# Patient Record
Sex: MALE | Race: WHITE | NOT HISPANIC OR LATINO | URBAN - METROPOLITAN AREA
[De-identification: names, ages, dates, MRNs, and addresses within clinical notes are randomized per-mention and may not be internally consistent; named-entity substitution may affect disease eponyms.]

---

## 2017-08-23 ENCOUNTER — IMPORTED ENCOUNTER (OUTPATIENT)
Dept: URBAN - METROPOLITAN AREA CLINIC 31 | Facility: CLINIC | Age: 47
End: 2017-08-23

## 2017-08-23 PROBLEM — H10.403: Noted: 2017-08-23

## 2017-08-23 PROCEDURE — 92015 DETERMINE REFRACTIVE STATE: CPT

## 2017-08-23 PROCEDURE — V2520 CONTACT LENS HYDROPHILIC: HCPCS

## 2017-08-23 PROCEDURE — 92310 CONTACT LENS FITTING OU: CPT

## 2017-08-23 PROCEDURE — 92014 COMPRE OPH EXAM EST PT 1/>: CPT

## 2017-08-24 ENCOUNTER — OFFICE VISIT (OUTPATIENT)
Dept: PODIATRY | Facility: CLINIC | Age: 47
End: 2017-08-24

## 2017-08-24 VITALS — BODY MASS INDEX: 41.75 KG/M2 | HEIGHT: 73 IN | WEIGHT: 315 LBS

## 2017-08-24 DIAGNOSIS — M72.2 PLANTAR FASCIITIS: Primary | ICD-10-CM

## 2017-08-24 DIAGNOSIS — M79.672 FOOT PAIN, LEFT: ICD-10-CM

## 2017-08-24 DIAGNOSIS — M21.42 PES PLANUS OF BOTH FEET: ICD-10-CM

## 2017-08-24 DIAGNOSIS — M24.573 EQUINUS CONTRACTURE OF ANKLE: ICD-10-CM

## 2017-08-24 DIAGNOSIS — M21.41 PES PLANUS OF BOTH FEET: ICD-10-CM

## 2017-08-24 PROCEDURE — 99203 OFFICE O/P NEW LOW 30 MIN: CPT | Performed by: PODIATRIST

## 2017-08-24 RX ORDER — MELOXICAM 15 MG/1
TABLET ORAL
COMMUNITY
Start: 2017-08-11

## 2017-08-24 RX ORDER — METHYLPREDNISOLONE 4 MG/1
TABLET ORAL
Qty: 21 TABLET | Refills: 0 | Status: SHIPPED | OUTPATIENT
Start: 2017-08-24

## 2017-08-24 NOTE — PROGRESS NOTES
"Lazaro Altman  1970  46 y.o. male    08/24/2017  Chief Complaint   Patient presents with   • Left Foot - Pain           History of Present Illness    Lazaro Altman is a 46 y.o. male who presents for evaluation of left foot pain. Pain is primarily in heel and arch. This has been ongoing for a few months. Describes the pain as sharp and worse with weightbearing. Denies trauma or injuries. Denies prior treatments for this issue.         No past medical history on file.      No past surgical history on file.      No family history on file.      Social History     Social History   • Marital status:      Spouse name: N/A   • Number of children: N/A   • Years of education: N/A     Occupational History   • Not on file.     Social History Main Topics   • Smoking status: Former Smoker   • Smokeless tobacco: Not on file   • Alcohol use No   • Drug use: Not on file   • Sexual activity: Defer     Other Topics Concern   • Not on file     Social History Narrative   • No narrative on file         Current Outpatient Prescriptions   Medication Sig Dispense Refill   • meloxicam (MOBIC) 15 MG tablet        No current facility-administered medications for this visit.          OBJECTIVE    Ht 73\" (185.4 cm)  Wt (!) 350 lb (159 kg)  BMI 46.18 kg/m2      Review of Systems   Constitutional:  Denies recent weight loss, weight gain, fever or chills, no change in exercise tolerance  Musculoskeletal: Heel/foot pain.   Skin: No wounds or lesions  Neurological: Denies paresthesias.  Psychiatric/Behavioral: Denies depression        Physical Exam   Constitutional: he appears well-developed and well-nourished.   HEENT: Normocephalic. Atraumatic.  CV: No CP. RRR  Resp: Non-labored respirations.  Psychiatric: he has a normal mood and affect. his behavior is normal.         Lower Extremity Exam:  Vascular: DP/PT pulses palpable 2+.   Minimal plantar heel edema, Left  Foot warm  Neuro: Protective sensation intact, b/l.  DTRs " intact  Negative Tinel over tarsal tunnel  Integument: No open wounds or lesions.  No erythema, scaling  No masses  Musculoskeletal: LE muscle strength 5/5.   Gait normal  Ankle ROM decreased without pain or crepitus  STJ ROM full without pain or crepitus  Pain on palpation of plantar calcaneal tubercles medial plantar fascial band, Left  Minimal tenderness to lateral compression of calcaneus, Left  Pes planus      Foot foot radiographs- Normal osseous density. No acute fractures, subluxations or erosions. Pes planus. Calcaneal spurring.           ASSESSMENT AND PLAN    Lazaro was seen today for pain.    Diagnoses and all orders for this visit:    Foot pain, left  -     XR Foot 3+ View Left    -Comprehensive foot and ankle exam performed  -Radiographs ordered and reviewed  -Educated pt on diagnosis, etiology and treatment of plantar fasciitis.  -Continue motion control shoe  -Recommend power step over-the-counter inserts  -Aggressive stretching regimen, patient education materials dispensed  -Rx Medrol dosepak x 1 week  -Corticosteroid injection in the future if necessary  -Recheck 4 weeks            This document has been electronically signed by Sarina Porras MA on August 24, 2017 3:52 PM     EMR Dragon/Transcription disclaimer:   Much of this encounter note is an electronic transcription/translation of spoken language to printed text. The electronic translation of spoken language may permit erroneous, or at times, nonsensical words or phrases to be inadvertently transcribed; Although I have reviewed the note for such errors, some may still exist.    Sarina Porras MA  8/24/2017  3:52 PM

## 2019-11-22 ENCOUNTER — IMPORTED ENCOUNTER (OUTPATIENT)
Dept: URBAN - METROPOLITAN AREA CLINIC 31 | Facility: CLINIC | Age: 49
End: 2019-11-22

## 2019-11-22 PROBLEM — H43.812: Noted: 2019-11-22

## 2019-11-22 PROCEDURE — 92014 COMPRE OPH EXAM EST PT 1/>: CPT

## 2019-11-22 PROCEDURE — 92310 CONTACT LENS FITTING OU: CPT

## 2019-11-22 PROCEDURE — 92015 DETERMINE REFRACTIVE STATE: CPT

## 2019-11-22 PROCEDURE — 92250 FUNDUS PHOTOGRAPHY W/I&R: CPT

## 2021-05-08 ENCOUNTER — APPOINTMENT (OUTPATIENT)
Dept: CT IMAGING | Facility: HOSPITAL | Age: 51
End: 2021-05-08

## 2021-05-08 ENCOUNTER — HOSPITAL ENCOUNTER (EMERGENCY)
Facility: HOSPITAL | Age: 51
Discharge: LEFT BEFORE DISCHARGE | End: 2021-05-08
Attending: FAMILY MEDICINE | Admitting: FAMILY MEDICINE

## 2021-05-08 ENCOUNTER — APPOINTMENT (OUTPATIENT)
Dept: GENERAL RADIOLOGY | Facility: HOSPITAL | Age: 51
End: 2021-05-08

## 2021-05-08 VITALS
DIASTOLIC BLOOD PRESSURE: 86 MMHG | SYSTOLIC BLOOD PRESSURE: 182 MMHG | RESPIRATION RATE: 18 BRPM | OXYGEN SATURATION: 99 % | BODY MASS INDEX: 42.66 KG/M2 | HEART RATE: 62 BPM | HEIGHT: 72 IN | WEIGHT: 315 LBS | TEMPERATURE: 98.6 F

## 2021-05-08 DIAGNOSIS — I10 HYPERTENSION, UNSPECIFIED TYPE: ICD-10-CM

## 2021-05-08 DIAGNOSIS — M54.9 UPPER BACK PAIN: ICD-10-CM

## 2021-05-08 DIAGNOSIS — R20.0 RIGHT FACIAL NUMBNESS: Primary | ICD-10-CM

## 2021-05-08 LAB
ABO GROUP BLD: NORMAL
ALBUMIN SERPL-MCNC: 4.3 G/DL (ref 3.5–5.2)
ALBUMIN/GLOB SERPL: 1.5 G/DL
ALP SERPL-CCNC: 104 U/L (ref 39–117)
ALT SERPL W P-5'-P-CCNC: 27 U/L (ref 1–41)
ANION GAP SERPL CALCULATED.3IONS-SCNC: 8 MMOL/L (ref 5–15)
ANISOCYTOSIS BLD QL: ABNORMAL
AST SERPL-CCNC: 40 U/L (ref 1–40)
BILIRUB SERPL-MCNC: 0.5 MG/DL (ref 0–1.2)
BLD GP AB SCN SERPL QL: NEGATIVE
BUN SERPL-MCNC: 19 MG/DL (ref 6–20)
BUN/CREAT SERPL: 16.5 (ref 7–25)
CALCIUM SPEC-SCNC: 10.5 MG/DL (ref 8.6–10.5)
CHLORIDE SERPL-SCNC: 98 MMOL/L (ref 98–107)
CO2 SERPL-SCNC: 32 MMOL/L (ref 22–29)
CREAT SERPL-MCNC: 1.15 MG/DL (ref 0.76–1.27)
DEPRECATED RDW RBC AUTO: 38.9 FL (ref 37–54)
EOSINOPHIL # BLD MANUAL: 0.58 10*3/MM3 (ref 0–0.4)
EOSINOPHIL NFR BLD MANUAL: 4 % (ref 0.3–6.2)
ERYTHROCYTE [DISTWIDTH] IN BLOOD BY AUTOMATED COUNT: 14.3 % (ref 12.3–15.4)
FLUAV RNA RESP QL NAA+PROBE: NOT DETECTED
FLUBV RNA RESP QL NAA+PROBE: NOT DETECTED
GFR SERPL CREATININE-BSD FRML MDRD: 67 ML/MIN/1.73
GLOBULIN UR ELPH-MCNC: 2.9 GM/DL
GLUCOSE SERPL-MCNC: 129 MG/DL (ref 65–99)
HCT VFR BLD AUTO: 43.2 % (ref 37.5–51)
HGB BLD-MCNC: 14.4 G/DL (ref 13–17.7)
HOLD SPECIMEN: NORMAL
HOLD SPECIMEN: NORMAL
INR PPP: 0.99 (ref 0.8–1.2)
LYMPHOCYTES # BLD MANUAL: 4.18 10*3/MM3 (ref 0.7–3.1)
LYMPHOCYTES NFR BLD MANUAL: 28 % (ref 19.6–45.3)
LYMPHOCYTES NFR BLD MANUAL: 4 % (ref 5–12)
Lab: NORMAL
MCH RBC QN AUTO: 26 PG (ref 26.6–33)
MCHC RBC AUTO-ENTMCNC: 33.3 G/DL (ref 31.5–35.7)
MCV RBC AUTO: 78.1 FL (ref 79–97)
METAMYELOCYTES NFR BLD MANUAL: 5 % (ref 0–0)
MICROCYTES BLD QL: ABNORMAL
MONOCYTES # BLD AUTO: 0.58 10*3/MM3 (ref 0.1–0.9)
NEUTROPHILS # BLD AUTO: 8.36 10*3/MM3 (ref 1.7–7)
NEUTROPHILS NFR BLD MANUAL: 49 % (ref 42.7–76)
NEUTS BAND NFR BLD MANUAL: 9 % (ref 0–5)
NRBC SPEC MANUAL: 1 /100 WBC (ref 0–0.2)
PLATELET # BLD AUTO: 115 10*3/MM3 (ref 140–450)
PMV BLD AUTO: 10 FL (ref 6–12)
POTASSIUM SERPL-SCNC: 4 MMOL/L (ref 3.5–5.2)
PROT SERPL-MCNC: 7.2 G/DL (ref 6–8.5)
PROTHROMBIN TIME: 13.5 SECONDS (ref 11.1–15.3)
QT INTERVAL: 402 MS
QTC INTERVAL: 414 MS
RBC # BLD AUTO: 5.53 10*6/MM3 (ref 4.14–5.8)
RH BLD: POSITIVE
SARS-COV-2 RNA RESP QL NAA+PROBE: NOT DETECTED
SMALL PLATELETS BLD QL SMEAR: ABNORMAL
SODIUM SERPL-SCNC: 138 MMOL/L (ref 136–145)
T&S EXPIRATION DATE: NORMAL
TOXIC GRANULATION: ABNORMAL
VARIANT LYMPHS NFR BLD MANUAL: 1 % (ref 0–5)
WBC # BLD AUTO: 14.41 10*3/MM3 (ref 3.4–10.8)
WHOLE BLOOD HOLD SPECIMEN: NORMAL

## 2021-05-08 PROCEDURE — 70450 CT HEAD/BRAIN W/O DYE: CPT

## 2021-05-08 PROCEDURE — 99204 OFFICE O/P NEW MOD 45 MIN: CPT | Performed by: PSYCHIATRY & NEUROLOGY

## 2021-05-08 PROCEDURE — 0 IOPAMIDOL PER 1 ML: Performed by: FAMILY MEDICINE

## 2021-05-08 PROCEDURE — 93010 ELECTROCARDIOGRAM REPORT: CPT | Performed by: INTERNAL MEDICINE

## 2021-05-08 PROCEDURE — 70498 CT ANGIOGRAPHY NECK: CPT

## 2021-05-08 PROCEDURE — 71045 X-RAY EXAM CHEST 1 VIEW: CPT

## 2021-05-08 PROCEDURE — 25010000002 HYDRALAZINE PER 20 MG: Performed by: PHYSICIAN ASSISTANT

## 2021-05-08 PROCEDURE — 80053 COMPREHEN METABOLIC PANEL: CPT | Performed by: FAMILY MEDICINE

## 2021-05-08 PROCEDURE — 86901 BLOOD TYPING SEROLOGIC RH(D): CPT | Performed by: PHYSICIAN ASSISTANT

## 2021-05-08 PROCEDURE — 99284 EMERGENCY DEPT VISIT MOD MDM: CPT

## 2021-05-08 PROCEDURE — 93005 ELECTROCARDIOGRAM TRACING: CPT

## 2021-05-08 PROCEDURE — 86900 BLOOD TYPING SEROLOGIC ABO: CPT | Performed by: PHYSICIAN ASSISTANT

## 2021-05-08 PROCEDURE — 85007 BL SMEAR W/DIFF WBC COUNT: CPT | Performed by: FAMILY MEDICINE

## 2021-05-08 PROCEDURE — 86850 RBC ANTIBODY SCREEN: CPT | Performed by: PHYSICIAN ASSISTANT

## 2021-05-08 PROCEDURE — 70496 CT ANGIOGRAPHY HEAD: CPT

## 2021-05-08 PROCEDURE — 96374 THER/PROPH/DIAG INJ IV PUSH: CPT

## 2021-05-08 PROCEDURE — 93005 ELECTROCARDIOGRAM TRACING: CPT | Performed by: FAMILY MEDICINE

## 2021-05-08 PROCEDURE — 87636 SARSCOV2 & INF A&B AMP PRB: CPT | Performed by: PHYSICIAN ASSISTANT

## 2021-05-08 PROCEDURE — 85025 COMPLETE CBC W/AUTO DIFF WBC: CPT

## 2021-05-08 PROCEDURE — 85610 PROTHROMBIN TIME: CPT | Performed by: PHYSICIAN ASSISTANT

## 2021-05-08 RX ORDER — SODIUM CHLORIDE 0.9 % (FLUSH) 0.9 %
10 SYRINGE (ML) INJECTION AS NEEDED
Status: DISCONTINUED | OUTPATIENT
Start: 2021-05-08 | End: 2021-05-08 | Stop reason: HOSPADM

## 2021-05-08 RX ORDER — HYDRALAZINE HYDROCHLORIDE 20 MG/ML
10 INJECTION INTRAMUSCULAR; INTRAVENOUS ONCE
Status: COMPLETED | OUTPATIENT
Start: 2021-05-08 | End: 2021-05-08

## 2021-05-08 RX ADMIN — IOPAMIDOL 90 ML: 755 INJECTION, SOLUTION INTRAVENOUS at 12:30

## 2021-05-08 RX ADMIN — HYDRALAZINE HYDROCHLORIDE 10 MG: 20 INJECTION INTRAMUSCULAR; INTRAVENOUS at 11:39

## 2021-05-08 RX ADMIN — Medication 10 ML: at 11:43

## 2021-05-08 NOTE — CONSULTS
Stroke Consult Note    Patient Name: Lazaro Altman   MRN: [id number]  Age: 50 y.o.  Sex: male  : 1970    Primary Care Physician: Mario Patel MD  Referring Physician:  Ajit Wakefield MD      Chief Complaint/Reason for Consultation: Blurry vision    Subjective:    HPI:   History obtained from patient and from medical staff, no family members at bedside.  No previous history of recent stroke or transient ischemic attack, no history of seizures or passing out. No history of focal weakness suggesting relapsing remitting MS.  Patient taken no anticoagulation therapy or antiplatelet therapy.  No history of head trauma or meningitis or strong family history of epilepsy.  No strong family history of aneurysm. No recent change in patient daily routine.  No recent new medication her symptoms started unprovoked.patient complaint nonspecific headaches after he diagnosed of Covid 2020.  Patient complaint last week episode of right facial numbness blurry vision, associate elevated blood pressure.    ROS:  12 points of review of systems negative except as above.    History reviewed. No pertinent past medical history.  History reviewed. No pertinent surgical history.  History reviewed. No pertinent family history.  Social History     Socioeconomic History   • Marital status:      Spouse name: Not on file   • Number of children: Not on file   • Years of education: Not on file   • Highest education level: Not on file   Tobacco Use   • Smoking status: Former Smoker   Substance and Sexual Activity   • Alcohol use: No   • Sexual activity: Defer       Allergies   Allergen Reactions   • Penicillins      Prior to Admission medications    Medication Sig Start Date End Date Taking? Authorizing Provider   meloxicam (MOBIC) 15 MG tablet  17   Provider, MD Erin   MethylPREDNISolone (MEDROL, SUNNI,) 4 MG tablet Take as directed 17   Esau Clark DPM       Objective:    Temp:  [98.6 °F (37  °C)] 98.6 °F (37 °C)  Heart Rate:  [56-68] 56  Resp:  [18] 18  BP: (165-207)/() 165/94        NIH Stroke Scale 1  Time: 11:38 CDT  Person Administering Scale: Bashar A Mohsen, MD    1a  Level of consciousness: 0=alert; keenly responsive   1b. LOC questions:  0=Performs both tasks correctly   1c. LOC commands: 0=Performs both tasks correctly   2.  Best Gaze: 0=normal   3.  Visual: 0=No visual loss   4. Facial Palsy: 0=Normal symmetric movement   5a.  Motor left arm: 0=No drift, limb holds 90 (or 45) degrees for full 10 seconds   5b.  Motor right arm: 0=No drift, limb holds 90 (or 45) degrees for full 10 seconds   6a. motor left le=No drift, limb holds 90 (or 45) degrees for full 10 seconds   6b  Motor right le=No drift, limb holds 90 (or 45) degrees for full 10 seconds   7. Limb Ataxia: 0=Absent   8.  Sensory: 1=Mild to moderate sensory loss; patient feels pinprick is less sharp or is dull on the affected side; there is a loss of superficial pain with pinprick but patient is aware He is being touched   9. Best Language:  0=No aphasia, normal   10. Dysarthria: 0=Normal   11. Extinction and Inattention: 0=No abnormality    Total:   1       This was an audio and video enabled telemedicine encounter.       Physical Exam:   AAOX3 follow multi step command, Track objects all directions. No visual filed cut . Pupils equal and reactive. No clear facial droop. No nystagmus. Normal language fluency, repetition and comprehension. Power: Move all extremities without limitation. No drift, leg lags.  Did good finger nose finger. Normal sensory examination except right V3 facial distribution. Didn't evaluate gait at this time.    Hospital Meds:  Scheduled- hydrALAZINE, 10 mg, Intravenous, Once      Infusions-     PRNs- sodium chloride    Results Reviewed:  I have personally reviewed current lab, radiology, and data and agree with results.    Radiology Results          Lab Results  No results found for: CHOL, HDL,  LDLDIRECT, LDL, TRIG  Results from last 7 days   Lab Units 05/08/21  1100   WBC 10*3/mm3 14.41*   HEMOGLOBIN g/dL 14.4   MCV fL 78.1*   PLATELETS 10*3/mm3 115*     Results from last 7 days   Lab Units 05/08/21  1100   SODIUM mmol/L 138   POTASSIUM mmol/L 4.0   CHLORIDE mmol/L 98   CO2 mmol/L 32.0*   BUN mg/dL 19   CREATININE mg/dL 1.15   GLUCOSE mg/dL 129*   CALCIUM mg/dL 10.5       Assessment:  1.hypertensive emergency.  2. Rule out small since lacuna ischemic stroke.  3. Nontypical trigeminal neurologia.      Plan:  1. MRI of the brain and stat CTA  brain and neck.  2. Echocardiogram.  3. Hemoglobin A-1 C and lipid profile.  4. Physical therapy occupational therapy and speech therapy.  6. Aspirin 325 mg daily and Lipitor 40 mg daily, after initial head CT show no bleed.  7. GI and DVT prophylaxis.  8. Stat head CT for any neurological changes.  9. Frequent Carroll checks every 4 to 6 hours and vital sign check.  10. Stroke Risk  factor modification to the primary care team(HTN,DM).  11. All patient questions and concerns answered in detail.  12. Sed rate.    NIH Stroke Scale1  Time: 11:38 CDT  Person Administering Scale: Bashar A Mohsen, MD        This was an audio and video enabled telemedicine encounter.       Electronically signed by Bashar A Mohsen, MD, 05/08/21, 11:38 AM CDT.

## 2021-05-08 NOTE — ED NOTES
Patient requesting to leave at this time. Pt provided with AMA form, signed and witnessed by FREDDY Little. Pt education provided and instructed to return for new or worsening symptoms.      Sandy Tsang RN  05/08/21 [id number]

## 2021-05-08 NOTE — ED NOTES
Pt stated his pain between his shoulder blades was a 7/10. HERNESTO Beasley notified.      Niru Espinoza RN  05/08/21 [id number]

## 2021-05-08 NOTE — ED NOTES
Pt presented to the ER for c/o of right-sided jaw numbness, headache, and back pain occurring for the past 2 weeks. The pt stated he has had burred vision on and off for the past two weeks.      Niru Espinoza RN  05/08/21 110

## 2021-05-08 NOTE — ED PROVIDER NOTES
"Subjective   Patient presents to emergency department for right sided facial numbness x 2 weeks and intermittent blurry vision.  Also endorses upper back pain and elevated blood pressure.  He was seen at urgent care today and sent here for further evaluation.        History provided by:  Patient   used: No        Review of Systems   Constitutional: Negative for chills and fever.   HENT: Negative for sore throat and trouble swallowing.    Respiratory: Negative for cough, shortness of breath and wheezing.    Cardiovascular: Negative for chest pain.   Gastrointestinal: Negative for abdominal pain, nausea and vomiting.   Genitourinary: Negative for dysuria and flank pain.   Musculoskeletal: Positive for back pain.   Skin: Negative for color change.   Allergic/Immunologic: Negative for immunocompromised state.   Neurological: Positive for numbness. Negative for syncope and weakness.   Hematological: Does not bruise/bleed easily.   Psychiatric/Behavioral: Negative for confusion.       History reviewed. No pertinent past medical history.    Allergies   Allergen Reactions   • Penicillins        History reviewed. No pertinent surgical history.    History reviewed. No pertinent family history.    Social History     Socioeconomic History   • Marital status:      Spouse name: Not on file   • Number of children: Not on file   • Years of education: Not on file   • Highest education level: Not on file   Tobacco Use   • Smoking status: Former Smoker   Substance and Sexual Activity   • Alcohol use: No   • Sexual activity: Defer           Objective      BP (!) 182/86   Pulse 62   Temp 98.6 °F (37 °C) (Infrared)   Resp 18   Ht 182.9 cm (72\")   Wt (!) 167 kg (369 lb)   SpO2 99%   BMI 50.05 kg/m²     Physical Exam  Vitals and nursing note reviewed.   Constitutional:       Appearance: Normal appearance.   HENT:      Head: Normocephalic and atraumatic.      Mouth/Throat:      Mouth: Mucous membranes are " "moist.   Eyes:      Conjunctiva/sclera: Conjunctivae normal.   Cardiovascular:      Rate and Rhythm: Normal rate and regular rhythm.      Pulses: Normal pulses.      Heart sounds: Normal heart sounds.   Pulmonary:      Effort: Pulmonary effort is normal. No respiratory distress.      Breath sounds: Normal breath sounds. No wheezing.   Musculoskeletal:         General: No tenderness or deformity.   Skin:     General: Skin is warm.      Capillary Refill: Capillary refill takes less than 2 seconds.   Neurological:      General: No focal deficit present.      Mental Status: He is alert.      Comments: Sensory deficit right face   Psychiatric:         Mood and Affect: Mood normal.         Behavior: Behavior normal.         Thought Content: Thought content normal.         ECG 12 Lead      Date/Time: 5/8/2021 2:34 PM  Performed by: Ramos Robert PA-C  Authorized by: Ajit Wakefield MD   Interpreted by physician  Comparison: compared with previous ECG   Rhythm: sinus rhythm  Rate: normal  BPM: 64  ST Segments: ST segments normal  Clinical impression: normal ECG                 ED Course  ED Course as of May 08 2058   Sat May 08, 2021   1121 Discussed with Dr Mohsen.      [SHUBHAM]   1419 Paged Hospitalist.    [SHUBHAM]   1414 I discussed results with patient and need for admission to the hospital for further evaluation/treatment.  Patient is concerned over his upper back pain and I ordered pain medications for this and told him we would take a closer look at that as well.  Patient states \"I am just going to go home and be in pain\".  I advised patient strongly against this due to his hypertension, possible stroke-like symptoms, back pain and need for further evaluation/treatment.  Advised possible poor outcome up to and including death.  Patient persists despite education.  I advised him he may return at any time for further evaluation and treatment.    [SHUBHAM]      ED Course User Index  [SHUBHAM] Ramos Robert PA-C    "        Results for orders placed or performed during the hospital encounter of 05/08/21   COVID-19 and FLU A/B PCR - Swab, Nasopharynx    Specimen: Nasopharynx; Swab   Result Value Ref Range    COVID19 Not Detected Not Detected - Ref. Range    Influenza A PCR Not Detected Not Detected    Influenza B PCR Not Detected Not Detected   Comprehensive Metabolic Panel    Specimen: Blood   Result Value Ref Range    Glucose 129 (H) 65 - 99 mg/dL    BUN 19 6 - 20 mg/dL    Creatinine 1.15 0.76 - 1.27 mg/dL    Sodium 138 136 - 145 mmol/L    Potassium 4.0 3.5 - 5.2 mmol/L    Chloride 98 98 - 107 mmol/L    CO2 32.0 (H) 22.0 - 29.0 mmol/L    Calcium 10.5 8.6 - 10.5 mg/dL    Total Protein 7.2 6.0 - 8.5 g/dL    Albumin 4.30 3.50 - 5.20 g/dL    ALT (SGPT) 27 1 - 41 U/L    AST (SGOT) 40 1 - 40 U/L    Alkaline Phosphatase 104 39 - 117 U/L    Total Bilirubin 0.5 0.0 - 1.2 mg/dL    eGFR Non African Amer 67 >60 mL/min/1.73    Globulin 2.9 gm/dL    A/G Ratio 1.5 g/dL    BUN/Creatinine Ratio 16.5 7.0 - 25.0    Anion Gap 8.0 5.0 - 15.0 mmol/L   CBC Auto Differential    Specimen: Blood   Result Value Ref Range    WBC 14.41 (H) 3.40 - 10.80 10*3/mm3    RBC 5.53 4.14 - 5.80 10*6/mm3    Hemoglobin 14.4 13.0 - 17.7 g/dL    Hematocrit 43.2 37.5 - 51.0 %    MCV 78.1 (L) 79.0 - 97.0 fL    MCH 26.0 (L) 26.6 - 33.0 pg    MCHC 33.3 31.5 - 35.7 g/dL    RDW 14.3 12.3 - 15.4 %    RDW-SD 38.9 37.0 - 54.0 fl    MPV 10.0 6.0 - 12.0 fL    Platelets 115 (L) 140 - 450 10*3/mm3   Manual Differential    Specimen: Blood   Result Value Ref Range    Neutrophil % 49.0 42.7 - 76.0 %    Lymphocyte % 28.0 19.6 - 45.3 %    Monocyte % 4.0 (L) 5.0 - 12.0 %    Eosinophil % 4.0 0.3 - 6.2 %    Bands %  9.0 (H) 0.0 - 5.0 %    Metamyelocyte % 5.0 (H) 0.0 - 0.0 %    Atypical Lymphocyte % 1.0 0.0 - 5.0 %    Neutrophils Absolute 8.36 (H) 1.70 - 7.00 10*3/mm3    Lymphocytes Absolute 4.18 (H) 0.70 - 3.10 10*3/mm3    Monocytes Absolute 0.58 0.10 - 0.90 10*3/mm3    Eosinophils Absolute  0.58 (H) 0.00 - 0.40 10*3/mm3    nRBC 1.0 (H) 0.0 - 0.2 /100 WBC    Anisocytosis Slight/1+ None Seen    Microcytes Slight/1+ None Seen    Toxic Granulation Slight/1+ None Seen    Platelet Estimate Decreased Normal   Protime-INR    Specimen: Blood   Result Value Ref Range    Protime 13.5 11.1 - 15.3 Seconds    INR 0.99 0.80 - 1.20   ECG 12 Lead   Result Value Ref Range    QT Interval 402 ms    QTC Interval 414 ms   Type & Screen    Specimen: Blood   Result Value Ref Range    ABO Type O     RH type Positive     Antibody Screen Negative     T&S Expiration Date 5/11/2021 11:59:59 PM    PREVIOUS HISTORY    Specimen: Blood   Result Value Ref Range    Previous History No record on File    Light Blue Top   Result Value Ref Range    Extra Tube hold for add-on    Green Top (Gel)   Result Value Ref Range    Extra Tube Hold for add-ons.    Lavender Top   Result Value Ref Range    Extra Tube hold for add-on    Gold Top - SST   Result Value Ref Range    Extra Tube Hold for add-ons.    Lavender Top   Result Value Ref Range    Extra Tube hold for add-on                                        MDM    Final diagnoses:   Right facial numbness   Upper back pain   Hypertension, unspecified type       ED Disposition  ED Disposition     ED Disposition Condition Comment    [facility] Emergency Department  [address] [place] [id number] [phone]  Go to   if symptoms worsen.         Medication List      No changes were made to your prescriptions during this visit.          Ramos Robert PA-C  05/08/21 2762

## 2022-04-02 ASSESSMENT — TONOMETRY
OS_IOP_MMHG: 15
OD_IOP_MMHG: 18
OD_IOP_MMHG: 14
OS_IOP_MMHG: 17

## 2022-04-02 ASSESSMENT — VISUAL ACUITY
OD_SC: 20/20
OS_SC: 20/20

## 2022-07-09 ENCOUNTER — TELEPHONE ENCOUNTER (OUTPATIENT)
Dept: URBAN - METROPOLITAN AREA CLINIC 121 | Facility: CLINIC | Age: 52
End: 2022-07-09

## 2022-07-10 ENCOUNTER — TELEPHONE ENCOUNTER (OUTPATIENT)
Dept: URBAN - METROPOLITAN AREA CLINIC 121 | Facility: CLINIC | Age: 52
End: 2022-07-10

## 2024-04-05 ENCOUNTER — COMPREHENSIVE EXAM (OUTPATIENT)
Dept: URBAN - METROPOLITAN AREA CLINIC 27 | Facility: CLINIC | Age: 54
End: 2024-04-05

## 2024-04-05 DIAGNOSIS — H52.13: ICD-10-CM

## 2024-04-05 PROCEDURE — 92015 DETERMINE REFRACTIVE STATE: CPT

## 2024-04-05 PROCEDURE — 92014 COMPRE OPH EXAM EST PT 1/>: CPT

## 2024-04-05 PROCEDURE — 92499RS OCT RETINAL SCREENING, ELECTIVE

## 2024-04-05 PROCEDURE — 92310-2 LEVEL 2 CONTACT LENS MANAGEMENT

## 2024-04-05 ASSESSMENT — KERATOMETRY
OS_AXISANGLE_DEGREES: 154
OS_K2POWER_DIOPTERS: 43.50
OD_K1POWER_DIOPTERS: 43.75
OS_K1POWER_DIOPTERS: 44.00
OD_K2POWER_DIOPTERS: 43.25
OD_AXISANGLE2_DEGREES: 92
OS_AXISANGLE2_DEGREES: 64
OD_AXISANGLE_DEGREES: 002

## 2024-04-05 ASSESSMENT — TONOMETRY
OD_IOP_MMHG: 17
OS_IOP_MMHG: 18

## 2024-04-05 ASSESSMENT — VISUAL ACUITY
OD_CC: 20/20
OS_CC: 20/20
OS_SC: 20/50-2
OD_SC: 20/60

## 2025-04-17 ENCOUNTER — COMPREHENSIVE EXAM (OUTPATIENT)
Age: 55
End: 2025-04-17

## 2025-04-17 DIAGNOSIS — H52.13: ICD-10-CM

## 2025-04-17 PROCEDURE — 92014 COMPRE OPH EXAM EST PT 1/>: CPT

## 2025-04-17 PROCEDURE — 92310-3 LEVEL 3 SOFT LENS UPDATE

## 2025-04-17 PROCEDURE — 92015 DETERMINE REFRACTIVE STATE: CPT
